# Patient Record
(demographics unavailable — no encounter records)

---

## 2025-02-13 NOTE — HISTORY OF PRESENT ILLNESS
[FreeTextEntry1] : Annual  [de-identified] : 48 y/o F w/ PMH of thyroid Ca (papillary), s/p thyroidectomy and CLAYTON therapy in 2003 and 2008, hypothyroidism, OA (L knee) and hysterectomy 2018 (Fibroids) who presents for annual visit. Pt states that she is taking levothyroxine 112mcg qd. Pt states that she feels well no complaints at this time. She would like to try to lose some weight about 10lbs. Pt states that her diet includes lots of eggs and red meats. She takes OCT vitamins.

## 2025-02-13 NOTE — HEALTH RISK ASSESSMENT
[[id number]] : 0 [Change in mental status noted] : No change in mental status noted [Language] : denies difficulty with language [Behavior] : denies difficulty with behavior [Handling Complex Tasks] : denies difficulty handling complex tasks [Reasoning] : denies difficulty with reasoning [High Risk Behavior] : no high risk behavior [Reports changes in hearing] : Reports no changes in hearing [Reports changes in vision] : Reports no changes in vision [MammogramDate] : 10/23 [MammogramComments] : BIRADs 2 [PapSmearComments] : Hysterectomy 2018 (fibroids)  [ColonoscopyComments] : FOBT 2023 neg  [HIVComments] : Previously neg  [HepatitisCComments] : Previously neg

## 2025-02-13 NOTE — HEALTH RISK ASSESSMENT
[[id number]] : 0 [Change in mental status noted] : No change in mental status noted [Language] : denies difficulty with language [Behavior] : denies difficulty with behavior [Handling Complex Tasks] : denies difficulty handling complex tasks [Reasoning] : denies difficulty with reasoning [High Risk Behavior] : no high risk behavior [Reports changes in hearing] : Reports no changes in hearing [Reports changes in vision] : Reports no changes in vision [MammogramDate] : 10/23 [MammogramComments] : BIRADs 2 [PapSmearComments] : Hysterectomy 2018 (fibroids)  [ColonoscopyComments] : FOBT 2023 neg  [HIVComments] : Previously neg  [HepatitisCComments] : Previously neg

## 2025-02-13 NOTE — HISTORY OF PRESENT ILLNESS
[FreeTextEntry1] : Annual  [de-identified] : 48 y/o F w/ PMH of thyroid Ca (papillary), s/p thyroidectomy and CLAYTON therapy in 2003 and 2008, hypothyroidism, OA (L knee) and hysterectomy 2018 (Fibroids) who presents for annual visit. Pt states that she is taking levothyroxine 112mcg qd. Pt states that she feels well no complaints at this time. She would like to try to lose some weight about 10lbs. Pt states that her diet includes lots of eggs and red meats. She takes OCT vitamins.

## 2025-02-13 NOTE — ASSESSMENT
[FreeTextEntry1] : 48 y/o F w/ PMH of thyroid Ca (papillary), s/p thyroidectomy and CLAYTON therapy in 2003 and 2008, hypothyroidism and hysterectomy 2018 (Fibroids) and OA of L knee who presents for annual visit. Pt states that she is taking levothyroxine 112mcg qd. Pt states that she feels well no complaints at this time. She would like to try to lose some weight about 10lbs. Pt states that her diet includes lots of eggs and red meats. She takes OCT vitamins.   #Hypothyroidism  Pt with hypothyroidism s/p thyroidectomy 2/2 thyroid Ca (papillary) and CLAYTON therapy. Pt has been on Levothyroxine 112mcg qd. Pt last seen Endo (6/24) who recommended repeat thyroid US (not done yet). Will reorder Thyroid US, TSH and refer back to Endo for follow up. Pt with recent refill of levothyroxine 112mcg x1 month. No refill sent today pending TSH results.  - f/u TSH, A1c, Lipids  - Levothyroxine refill pending above labs - Rx Neck US  - f/u Endo (referral sent)   #HLD Pt with  (3/24) not on lipid lowering agents. Discussed with pt lifestyle modifications including diet and physical activity. Pt is motivated to decrease red meats and increase fruits and vegs to diet.  - f/u Lipid panel as above   #Inflamatory markers  Pt follows with PMR for OA of Left knee. Labs sent showing ISAIAS 1:160 speckled. Possible rheumatologic etiology for pain although knee Xray showing likely OA. However, given positive ISAIAS will resend Rheumatologic referral.  - Rheumatology referral sent   #HCM  #Annual physical  - Lucie due referral sent  - C-scope (pt deferred) Rx FOBT   - Cervical CA screening (pt s/p total hysterectomy)   Case discussed with Dr. Dejesus

## 2025-03-21 NOTE — REVIEW OF SYSTEMS
[Fever] : no fever [Chills] : no chills [Chest Pain] : no chest pain [Palpitations] : no palpitations [Shortness Of Breath] : no shortness of breath [Wheezing] : no wheezing [Cough] : no cough [Abdominal Pain] : no abdominal pain [Vomiting] : no vomiting [Dysuria] : no dysuria [Incontinence] : no incontinence [Hematuria] : no hematuria [Joint Pain] : no joint pain [Itching] : no itching [Headache] : no headache [de-identified] : +bump to back of neck

## 2025-03-21 NOTE — HEALTH RISK ASSESSMENT
[Little interest or pleasure doing things] : 1) Little interest or pleasure doing things [Feeling down, depressed, or hopeless] : 2) Feeling down, depressed, or hopeless [0] : 2) Feeling down, depressed, or hopeless: Not at all (0) [PHQ-9 Negative - No further assessment needed] : PHQ-9 Negative - No further assessment needed [Former] : Former [[id number]] : 0

## 2025-03-21 NOTE — END OF VISIT
[] : Resident [FreeTextEntry3] : 48F w/hx of thyroid ca, OA, here for concerns.  Sebaceous cyst - refer to derm  Hypothyroidism - repeat TFTs as last showed TSH mildly elevated and FT4 1.8, emailed [facility] for endo appt

## 2025-03-21 NOTE — END OF VISIT
[] : Resident [FreeTextEntry3] : 48F w/hx of thyroid ca, OA, here for concerns.  Sebaceous cyst - refer to derm  Hypothyroidism - repeat TFTs as last showed TSH mildly elevated and FT4 1.8, emailed [facility] for endo appt

## 2025-03-21 NOTE — HISTORY OF PRESENT ILLNESS
[FreeTextEntry1] : pt here for follow up [de-identified] : 48F w/ PMHx of Papillary thyroid cancer (s/p total thyroidectomy and radiation therapy), hypothyroidism, OA, hysterectomy, presenting for one month follow-up and bump on neck.   She states over the past few weeks she has developed a bump on the right side of the back of her neck which has increased in size with associated pain to palpation and neck movement.

## 2025-03-21 NOTE — PHYSICAL EXAM
[No Acute Distress] : no acute distress [Well Nourished] : well nourished [Well Developed] : well developed [Well-Appearing] : well-appearing [Normal Sclera/Conjunctiva] : normal sclera/conjunctiva [PERRL] : pupils equal round and reactive to light [EOMI] : extraocular movements intact [No Respiratory Distress] : no respiratory distress  [Normal Rate] : normal rate  [Regular Rhythm] : with a regular rhythm [Normal S1, S2] : normal S1 and S2 [No Murmur] : no murmur heard [Normal] : normal rate, regular rhythm, normal S1 and S2 and no murmur heard [Soft] : abdomen soft [Non Tender] : non-tender [Non-distended] : non-distended [No HSM] : no HSM [Normal Bowel Sounds] : normal bowel sounds [Coordination Grossly Intact] : coordination grossly intact [Normal Gait] : normal gait [Normal Affect] : the affect was normal [Normal Insight/Judgement] : insight and judgment were intact [de-identified] : + papule to back of neck on the right side, no warmth or erythema, tender to palpation no changes in color

## 2025-03-21 NOTE — HEALTH RISK ASSESSMENT
[Little interest or pleasure doing things] : 1) Little interest or pleasure doing things [Feeling down, depressed, or hopeless] : 2) Feeling down, depressed, or hopeless [0] : 2) Feeling down, depressed, or hopeless: Not at all (0) [PHQ-9 Negative - No further assessment needed] : PHQ-9 Negative - No further assessment needed [Former] : Former [[id number]] : 0

## 2025-03-21 NOTE — HISTORY OF PRESENT ILLNESS
[FreeTextEntry1] : pt here for follow up [de-identified] : 48F w/ PMHx of Papillary thyroid cancer (s/p total thyroidectomy and radiation therapy), hypothyroidism, OA, hysterectomy, presenting for one month follow-up and bump on neck.   She states over the past few weeks she has developed a bump on the right side of the back of her neck which has increased in size with associated pain to palpation and neck movement.

## 2025-03-21 NOTE — REVIEW OF SYSTEMS
[Fever] : no fever [Chills] : no chills [Chest Pain] : no chest pain [Palpitations] : no palpitations [Shortness Of Breath] : no shortness of breath [Wheezing] : no wheezing [Cough] : no cough [Abdominal Pain] : no abdominal pain [Vomiting] : no vomiting [Dysuria] : no dysuria [Incontinence] : no incontinence [Hematuria] : no hematuria [Joint Pain] : no joint pain [Itching] : no itching [Headache] : no headache [de-identified] : +bump to back of neck

## 2025-03-21 NOTE — PHYSICAL EXAM
[No Acute Distress] : no acute distress [Well Nourished] : well nourished [Well Developed] : well developed [Well-Appearing] : well-appearing [Normal Sclera/Conjunctiva] : normal sclera/conjunctiva [PERRL] : pupils equal round and reactive to light [EOMI] : extraocular movements intact [No Respiratory Distress] : no respiratory distress  [Normal Rate] : normal rate  [Regular Rhythm] : with a regular rhythm [Normal S1, S2] : normal S1 and S2 [No Murmur] : no murmur heard [Normal] : normal rate, regular rhythm, normal S1 and S2 and no murmur heard [Soft] : abdomen soft [Non Tender] : non-tender [Non-distended] : non-distended [No HSM] : no HSM [Normal Bowel Sounds] : normal bowel sounds [Coordination Grossly Intact] : coordination grossly intact [Normal Gait] : normal gait [Normal Affect] : the affect was normal [Normal Insight/Judgement] : insight and judgment were intact [de-identified] : + papule to back of neck on the right side, no warmth or erythema, tender to palpation no changes in color

## 2025-03-21 NOTE — PLAN
[FreeTextEntry1] : 48F w/ PMHx of Papillary thyroid cancer (s/p total thyroidectomy and radiation therapy), hypothyroidism, OA, hysterectomy, presenting for one month follow-up and bump on neck.

## 2025-06-10 NOTE — HISTORY OF PRESENT ILLNESS
[FreeTextEntry1] : Karri Steele M.D. Sports Medicine and Spine Department of Physical Medicine and Rehabilitation [facility] Orth[facility] [address], [address] [place] [address]   [facility] Physician [facility] [address], [address] [place] [address]   For [place] Appointments Phone: [phone] Fax: [phone]   ----------------------------------------------------------------------------------------------------------------------------------------   PATIENT: ART IVY MRN: [id number] DATE OF BIRTH: Taurus  3 1976 DATE OF SERVICE:  6/10/2025   6/10/2025    Dear Drs.   Thank you for referring ART IVY to my Sports and Interventional Spine practice and office. Enclosed is a copy of the patient's consultation/progress note, which includes my complete assessment and recent studies completed during the patient's evaluation.   If you have questions or have any patients who require nonsurgical, non-opiate management of any sports, spine, or musculoskeletal conditions, please do not hesitate to contact my  at [phone].   I look forward to taking care of your patients along with you.   Sincerely,   Karri Steele MD Sports, Interventional Spine, & Regenerative Musculoskeletal Medicine Orthopaedic [facility]                                                       Initial Consultation: CC: left leg pain   HPI:  This is a follow up visit to [facility] Interventional Spine Practice.   ART IVY presents with the chief complaint as above.   Interval Hx on Taurus 10, 2025: presents for follow up. At the last visit, we advised topical compound and further physical therapy. Since the last visit, patient has continued mostly home exercise program, over 12 months since last PT session. patient cites moving to [facility] as part of the difficulty with undergoing PT (for the knees). she suffered one fall with right knee contusion, some residual pain present as of Taurus 10, 2025. There have been no significant changes to their aggravating or alleviating factors since the last visit. she had good use of hinged left knee brace prior to it malfunctioning, currently has not functioning brace for the left knee. rates her pain 5-7/10 intermittently 8/10 especially with sit to stand movements. feels better after a few steps.  Pharmacologic treatments now include OTC analgesics PRN, but otherwise pharmacologic treatments are minimal. Denies new or worsened numbness, tingling, or focal motor deficit. Denies interval fall, accident, or injury. Denies change in bowel or bladder functioning. she has been using topical compound from 2024 for both knees and posterior right knee following her recent fall..   Initial Hx on 04/11/2024 : Presents in person to [facility] symptoms in the left lower limb started in 2023 patient had extensive PT treatments over the summer, had initially felt left leg pain 2/2023 patient attended physical therapy 1-2 sessions for a few month, [facility] [address] [place] [address], 3rd [place] [address]. Phone: [phone]. patient was focusing on their left knee with PT  The patients difficulties began 2/2023 The pain is graded as 5/10 points to the left knee generally "inside and the outside" The pain is described as sharp, throbbing The pain is constant, over the left knee The pain does not radiate The patient feels that the pain is overall persistent patient had "sciatica" type symptoms on the right lower limb two years ago Patient denies other recent fall, MVA, injury, trauma, or accident besides presenting history above   Aggravating: ambulation, knee flexion,  Alleviating: rest, activity modification, pharmacologic treatments as per intake and as above (naprosyn 500mg 1-2 times per week)   Meds: denies regular PO pain medications Therapy Program: no recent structured targeted therapy program since Fall 2023 HEP: doing HEP regularly Injection Hx: denies locally directed treatment to the area in question Imaging Hx: reviewed   Assoc Sx: Reports intermittent numbness, tingling paresthesia in the LEFT LOWER limbs in a non-dermatomal distribution Otherwise denies numbness, Tingling   Denies Focal motor weakness in the upper or lower limbs Denies New or worsened bowel or bladder incontinence Denies Saddle anesthesia Denies Using Orthotic(s)/Supportive devices Denies Swelling in the upper/lower extremities + Buckling, left knee + Clicking, left knee They also deny frequent tripping, falling   ROS: A 14 point review of systems was completed. Positive findings are pain as described above. The remaining systems negative.   Breast Cancer Surveillance: up to date COVID HX: reviewed   Assoc Hx: Ambulates without assistive device Level of functioning: indep with ambulation, indep with ADLs Living Situation: elevator accessible apartment dwelling with steps to enter

## 2025-06-10 NOTE — DATA REVIEWED
[FreeTextEntry1] : 06/10/2025 In-office weight bearing x-rays of the LEFT RIGHT knees (AP, flexion weight bearing, lateral, and sunrise views) demonstrate: RIGHT KNEE: mild narrowing of the lateral compartment mild narrowing of the medial compartment severe narrowing of the patellofemoral joint + chondromalacia + Tricompartmental periarticular osteophytosis No fracture No dislocation neg joint effusion neg Posterior intra-articular loose body   LEFT KNEE: mild narrowing of the lateral compartment moderate narrowing of the medial compartment severe narrowing of the patellofemoral joint + chondromalacia + Tricompartmental periarticular osteophytosis No fracture No dislocation neg joint effusion neg Posterior intra-articular loose body

## 2025-06-10 NOTE — PHYSICAL EXAM
[FreeTextEntry1] : GEN: NAD, well dressed, well nourished HEENT: NCAT, oropharynx clear CV: S1S2, RRR RESP: on room air, no labored breathing ABD: non distended EXT: well perfused, no calf tenderness  Gait: Non antalgic  +  reciprocating heel to toe able to stand on toes and heels WITH hand holding Tandem gait intact WITH hand holding Poor single leg standing balance Able to stand on either lower limb without LOB for 15 seconds Romberg negative  Functional strength:   Double legged squat + medial knee displacement  + heel lift (off the ground), right + heel lift (off the ground), left ankle/foot hyperactivity present    Single leg squat  + corkscrewing  + pelvic drop  + mild knee valgus  + foot hyperpronation  RIGHT KNEE: no scars no effusion no laceration no increased warmth no erythema no varus deformity no valgus deformity absent J sign   ROM Full range of motion in extension, no flexion contracture No added AROM/PROM with knee extension Full range of motion in flexion, 0-135   Limited range of motion with extension, lacking terminal Limited range of motion with flexion,   Palpation + crepitus neg TTP over the quadriceps tendon neg TTP over the patellar tendon neg TTP over the base of the patella neg TTP over the apex of the patella neg TTP over medial border of the patella neg TTP over lateral border of the patella neg TTP over the medial joint line neg TTP over the lateral joint line neg TTP over pes anserine area in the medial face of the tibia neg TTP over tibial tuberosity neg TTP over fibular head neg TTP over the popliteal fossa   Manual Muscle Testing 5/5 in all planes with resisted isometric stress   For ipsilateral hip strength: + weakness with resisted external rotation of the hip flexed to 90, knee flexed to 90, and hip externally rotated 20 degrees RIGHT (gluteus medius)   neg laxity with varus stress with the knee extended at 0 neg laxity with varus stress with the knee extended at 30 neg laxity with valgus stress with the knee extended at 0 neg laxity with valgus stress with the knee extended at 30 neg Lachmann Test neg Anterior drawer neg Posterior drawer neg Clarkes Sign neg Patellar apprehension neg Apleys Rotation-Distraction Test (increased rotation, ligamentous) vs contralateral limb neg Apleys Rotation-Compression Test (decreased rotation, meniscal) vs contralateral limb neg Shola Test with Tibia Externally Rotated (MM) neg Shola Test with Tibia Internally Rotated (LM) neg Thessaly Test   Sensation intact to light touch over all aspects of the right lower leg Distal pulses intact   LEFT KNEE: no scars no effusion no laceration no increased warmth no erythema no varus deformity no valgus deformity absent  J sign   ROM Full range of motion in extension, no flexion contracture No added AROM/PROM with knee extension Full range of motion in flexion, 0-135   Limited range of motion with extension, lacking terminal Limited range of motion with flexion,   Palpation + crepitus neg TTP over the quadriceps tendon neg TTP over the patellar tendon neg TTP over the base of the patella neg TTP over the apex of the patella neg TTP over medial border of the patella neg TTP over lateral border of the patella neg TTP over the medial joint line neg TTP over the lateral joint line neg TTP over pes anserine area in the medial face of the tibia neg TTP over tibial tuberosity neg TTP over fibular head neg TTP over the popliteal fossa   Manual Muscle Testing 5/5 in all planes with resisted isometric stress   For ipsilateral hip strength: + weakness with resisted external rotation of the hip flexed to 90, knee flexed to 90, and hip externally rotated 20 degrees LEFT (gluteus medius)  neg laxity with varus stress with the knee extended at 0 neg laxity with varus stress with the knee extended at 30 neg laxity with valgus stress with the knee extended at 0 neg laxity with valgus stress with the knee extended at 30 neg Lachmann Test neg Anterior drawer neg Posterior drawer neg Clarkes Sign neg Patellar apprehension neg Apleys Rotation-Distraction Test (increased rotation, ligamentous) vs contralateral limb neg Apleys Rotation-Compression Test (decreased rotation, meniscal) vs contralateral limb neg Shola Test with Tibia Externally Rotated (MM) neg Shola Test with Tibia Internally Rotated (LM) neg Thessaly Test   Sensation intact to light touch over all aspects of the right lower leg Distal pulses intact   neg Antalgic gait

## 2025-06-10 NOTE — ASSESSMENT
[FreeTextEntry1] :                                                       Assessment/Plan:   ART IVY is a 49 year female with left > right knee pain here for follow up    Osteoarthritis of knee, left > right Decreased knee range of motion, left Effusion of knee, left Instability of knee, left Functional decline  Symmetric polyarthritis Inflammatory pain   History of Papillary carcinoma of thyroid s/p total thyroidectomy with hypothyroidism   - Tiers of treatment and management of above diagnosis(es) were discussed with patient - Optimal diet, weight, sleep, and lifestyle management to minimize stress and maximize well being counseling provided - Imaging reviewed and discussed with patient - Reviewed previous encounter notes from 3/28/2024 Dr. Dr. CLOVIS Pedroza (Internal Medicine) - Patient was advised to start a structured, targeted therapy program 2-3x/wk for 8 wks with goal toward HEP - Patient was educated on an appropriate home exercise program, provided with exercise recommendations, all questions answered - Ordered lab testing to identify biomarkers that may signal broader inflammatory, auto-immune component to their pain, presentation - Patient may trial acetaminophen 1000mg up to TID PRN moderate to severe pain and to decrease average consumption of NSAIDs - Patient may trial topical compound cream to the left knee twice per day for the next 7-10 days. Afterwards, they may continue to apply as needed only. Rx entered via portal, written information provided to the patient in addition to verbal explanation regarding the medication - Patient was advised to apply cool compresses or warm heat to affected regions PRN - Radiographs of B/L knees  ordered 04/11/2024 - Ordered lab testing to identify biomarkers that may signal broader inflammatory, auto-immune component to their pain, presentation - Apr 11, 2024: ordered lower limb venous duplex; left popliteal fossa pain, left leg swelling  - Educated about red flag symptoms including (but not limited to) new, worsened, or persistent: fever greater than 100F, bowel or bladder incontinence, bowel obstipation, inability to void urine, urinary leakage, Severe nausea or vomiting, Worsening numbness, worsening tingling/paresthesias, and/or new or progressive motor weakness; advised to seek immediate medical attention at his nearest Emergency department should they experience any of the above     Taurus 10, 2025 referral for resuming PT Taurus 10, 2025 referral for new topical: - Patient may trial topical compound cream to the right/left knee twice per day for the next 7-10 days. Afterwards, they may continue to apply as needed only. Rx entered via portal, written information provided to the patient in addition to verbal explanation regarding the medication. Patient informed that if not covered they would be welcome to try topical over the counter Voltaren (diclofenac 1%) gel in the same region as above. Taurus 10, 2025 regarding brace: Patient is experiencing knee pain and swelling with varus alignment of the left knee, antalgic gait, and pain with brief weight bearing and pain during expected functional terminal range of motion of the left knee, the pain as of today interferes with their ability to perform ADLs as well as their ability to make a living/maintain her gainful employment. Patient's clinical exam demonstrates findings as documented including varus joint laxity. Their pain persists despite use of medication as directed by their PCP. ART IVY will benefit from the use of an left knee  brace (medial ) which decreases the load on the degenerative compartment of the knee.ART IVY will use the  brace which maximizes stability and support for knee osteoarthritis. As such, we expect significant improvement in pain, stiffness, and physical function; preventing or reducing degenerative changes in the knee; allowing the patient to return to reasonable activities which may help them maintain a healthy weight; preserving the long-term viability of the knee; and increased resistance to injury from valgus, varus, rotational or anterior-posterior translation forces with the  brace. Patient has significant OA with medial lateral instability and correctable malalignment as evidenced in the physical exam and x-rays. A brace is required to correct the deformity/instability. Narrowing of the Joint space is confirmed proof of Jaint laxity as is seen on physical exam and x-rays. A brace is required to support the instability and push the knee into a corrected position  - Follow up in 2 months after lab tests, after PT   I have personally spent a total of at least 45 minutes preparing, reviewing internal and external records, explaining, counseling, providing necessary information via documented paperwork for this encounter, and coordinating care for this patient encounter.   Thank you, (s), for allowing me to participate in the care of your patient. Please do not hesitate to contact me with questions/concerns.   Karri Steele M.D. Sports and Interventional Spine Department of Physical Medicine and Rehabilitation [facility] [place] [facility] [address] [place] [address]   Appointments: [phone] Fax: [phone]